# Patient Record
Sex: MALE | Race: WHITE | Employment: UNEMPLOYED | URBAN - METROPOLITAN AREA
[De-identification: names, ages, dates, MRNs, and addresses within clinical notes are randomized per-mention and may not be internally consistent; named-entity substitution may affect disease eponyms.]

---

## 2018-01-10 ENCOUNTER — OFFICE VISIT (OUTPATIENT)
Dept: GERIATRIC MEDICINE | Age: 58
End: 2018-01-10

## 2018-01-10 DIAGNOSIS — I63.9 CEREBROVASCULAR ACCIDENT (CVA), UNSPECIFIED MECHANISM (HCC): Primary | ICD-10-CM

## 2018-01-10 DIAGNOSIS — E55.9 VITAMIN D DEFICIENCY: ICD-10-CM

## 2018-01-10 DIAGNOSIS — I10 ESSENTIAL HYPERTENSION: ICD-10-CM

## 2018-01-10 DIAGNOSIS — R53.1 WEAKNESS: ICD-10-CM

## 2018-01-10 DIAGNOSIS — R13.10 DYSPHAGIA, UNSPECIFIED TYPE: ICD-10-CM

## 2018-01-10 DIAGNOSIS — F41.9 ANXIETY: ICD-10-CM

## 2018-01-10 DIAGNOSIS — R56.9 SEIZURE (HCC): ICD-10-CM

## 2018-01-10 LAB
INR BLD: 1.1
PROTIME: 11.7 SECONDS

## 2018-01-10 PROCEDURE — 3017F COLORECTAL CA SCREEN DOC REV: CPT | Performed by: INTERNAL MEDICINE

## 2018-01-10 PROCEDURE — 99306 1ST NF CARE HIGH MDM 50: CPT | Performed by: INTERNAL MEDICINE

## 2018-01-10 RX ORDER — DOXAZOSIN 2 MG/1
2 TABLET ORAL EVERY MORNING
COMMUNITY

## 2018-01-10 RX ORDER — FAMOTIDINE 20 MG/1
20 TABLET, FILM COATED ORAL 2 TIMES DAILY
COMMUNITY

## 2018-01-10 RX ORDER — ASPIRIN 81 MG/1
81 TABLET, CHEWABLE ORAL DAILY
COMMUNITY

## 2018-01-10 RX ORDER — FOLIC ACID 1 MG/1
1 TABLET ORAL DAILY
COMMUNITY
End: 2018-05-24

## 2018-01-10 RX ORDER — ALBUTEROL SULFATE 2.5 MG/3ML
2.5 SOLUTION RESPIRATORY (INHALATION) EVERY 4 HOURS PRN
COMMUNITY

## 2018-01-10 RX ORDER — SENNOSIDES A AND B 8.6 MG/1
1 TABLET, FILM COATED ORAL DAILY
COMMUNITY
End: 2018-05-24

## 2018-01-10 RX ORDER — FLUOXETINE 20 MG/5ML
2.5 SOLUTION ORAL DAILY
COMMUNITY

## 2018-01-10 RX ORDER — ATORVASTATIN CALCIUM 80 MG/1
80 TABLET, FILM COATED ORAL DAILY
COMMUNITY

## 2018-01-10 RX ORDER — ACETAMINOPHEN 650 MG/1
650 SUPPOSITORY RECTAL EVERY 4 HOURS PRN
COMMUNITY

## 2018-01-10 RX ORDER — PHENYTOIN 125 MG/5ML
1.2 SUSPENSION ORAL NIGHTLY
COMMUNITY

## 2018-01-10 RX ORDER — BUDESONIDE 0.5 MG/2ML
1 INHALANT ORAL 2 TIMES DAILY
COMMUNITY

## 2018-01-10 RX ORDER — OMEPRAZOLE 20 MG/1
20 CAPSULE, DELAYED RELEASE ORAL 2 TIMES DAILY
COMMUNITY
End: 2018-05-24

## 2018-01-10 RX ORDER — WARFARIN SODIUM 5 MG/1
5 TABLET ORAL DAILY
COMMUNITY
End: 2018-05-24

## 2018-01-10 RX ORDER — ELECTROLYTES/DEXTROSE
1 SOLUTION, ORAL ORAL EVERY MORNING
COMMUNITY

## 2018-01-10 RX ORDER — ACETAMINOPHEN 325 MG/1
650 TABLET ORAL EVERY 4 HOURS PRN
COMMUNITY

## 2018-01-10 RX ORDER — NICOTINE 21 MG/24HR
1 PATCH, TRANSDERMAL 24 HOURS TRANSDERMAL EVERY 24 HOURS
COMMUNITY
End: 2018-05-24

## 2018-01-11 LAB
BUN BLDV-MCNC: 9 MG/DL
CALCIUM SERPL-MCNC: 9.5 MG/DL
CHLORIDE BLD-SCNC: 102 MMOL/L
CO2: 25 MMOL/L
CREAT SERPL-MCNC: 0.5 MG/DL
GFR CALCULATED: NORMAL
GLUCOSE BLD-MCNC: 91 MG/DL
INR BLD: 1.1
POTASSIUM SERPL-SCNC: 4 MMOL/L
PROTIME: 12 SECONDS
SODIUM BLD-SCNC: 139 MMOL/L

## 2018-01-16 VITALS — TEMPERATURE: 96.9 F | DIASTOLIC BLOOD PRESSURE: 60 MMHG | SYSTOLIC BLOOD PRESSURE: 132 MMHG | HEART RATE: 82 BPM

## 2018-01-16 LAB
BUN BLDV-MCNC: 8 MG/DL
CALCIUM SERPL-MCNC: 9.5 MG/DL
CHLORIDE BLD-SCNC: 103 MMOL/L
CO2: 24 MMOL/L
CREAT SERPL-MCNC: 0.7 MG/DL
GFR CALCULATED: NORMAL
GLUCOSE BLD-MCNC: 96 MG/DL
POTASSIUM SERPL-SCNC: 4.1 MMOL/L
SODIUM BLD-SCNC: 139 MMOL/L

## 2018-01-16 NOTE — PROGRESS NOTES
PATIENT: Artie Souza : 1960 DOS: 01/10/2018     [facility] COMMU    Admission history and physical.     CHIEF COMPLAINT:  Weakness status post CVA, COPD. HISTORY OF PRESENT ILLNESS:  This is a 50-year-old gentleman admitted here from [facility]. The patient has a history of alcoholism, seizure disorder along with COPD. The patient presents to [facility] on 2017 after a fall. The patient was found to have evidence of CVA, facial asymmetry. No loss of consciousness. The patient was intubated for airway protection. The patient did have acute infarct in the left insular cortex, possible small cortical infarct in the left parietal lobe. The patient did have occlusion of the left ICA along with sub-occlusive thrombus throughout M1, M2 territory in the left MCA. The patient was placed in the neuro ICU. The patient had been placed on aspirin given his echocardiogram, MRI of the head. The patient was given Dilantin for seizure evaluation. The patient was seen by vascular surgery, who did evaluate the patient for his chronic thrombus. The patient was recommended non-operative intervention. The patient did undergo IVC filter placement by interventional radiology on 2017 with the plan to remove the filter after 6 weeks after discharge. The patient unfortunately did have evidence of GI bleed with coffee-ground emesis while on heparin. The patient's hemoglobin and hematocrit stabilized. The patient did have DVT in left lower extremity. The patient did have possible type 2 non-ST elevation MI, respiratory failure. The patient was stabilized, was profoundly weak globally. The patient did undergo inpatient rehabilitation and subsequently transferred here for course of rehabilitation prior to return home.       PAST MEDICAL HISTORY:  Included hypertension, status post CVA in left MCA distribution, weakness, seizure disorder, alcoholism, ataxia, unsteadiness, DVT

## 2018-01-17 LAB
BUN BLDV-MCNC: 7 MG/DL
CALCIUM SERPL-MCNC: 9.1 MG/DL
CHLORIDE BLD-SCNC: 102 MMOL/L
CO2: 25 MMOL/L
CREAT SERPL-MCNC: 0.6 MG/DL
GFR CALCULATED: NORMAL
GLUCOSE BLD-MCNC: 133 MG/DL
POTASSIUM SERPL-SCNC: 3.6 MMOL/L
SODIUM BLD-SCNC: 137 MMOL/L

## 2018-01-18 LAB
BASOPHILS ABSOLUTE: ABNORMAL /ΜL
BASOPHILS RELATIVE PERCENT: ABNORMAL %
BUN BLDV-MCNC: 7 MG/DL
CALCIUM SERPL-MCNC: 9.4 MG/DL
CHLORIDE BLD-SCNC: 104 MMOL/L
CO2: 27 MMOL/L
CREAT SERPL-MCNC: 0.6 MG/DL
EOSINOPHILS ABSOLUTE: ABNORMAL /ΜL
EOSINOPHILS RELATIVE PERCENT: ABNORMAL %
GFR CALCULATED: NORMAL
GLUCOSE BLD-MCNC: 89 MG/DL
HCT VFR BLD CALC: 39.6 % (ref 41–53)
HEMOGLOBIN: 13.1 G/DL (ref 13.5–17.5)
LYMPHOCYTES ABSOLUTE: ABNORMAL /ΜL
LYMPHOCYTES RELATIVE PERCENT: ABNORMAL %
MCH RBC QN AUTO: 31.8 PG
MCHC RBC AUTO-ENTMCNC: 33.1 G/DL
MCV RBC AUTO: 96.1 FL
MONOCYTES ABSOLUTE: ABNORMAL /ΜL
MONOCYTES RELATIVE PERCENT: ABNORMAL %
NEUTROPHILS ABSOLUTE: ABNORMAL /ΜL
NEUTROPHILS RELATIVE PERCENT: ABNORMAL %
PLATELET # BLD: 356 K/ΜL
PMV BLD AUTO: 9.6 FL
POTASSIUM SERPL-SCNC: 4.3 MMOL/L
RBC # BLD: 4.12 10^6/ΜL
SODIUM BLD-SCNC: 139 MMOL/L
WBC # BLD: 8.4 10^3/ML

## 2018-01-22 LAB
INR BLD: 3.6
PROTIME: 40.4 SECONDS

## 2018-01-25 ENCOUNTER — OFFICE VISIT (OUTPATIENT)
Dept: GERIATRIC MEDICINE | Age: 58
End: 2018-01-25
Payer: COMMERCIAL

## 2018-01-25 DIAGNOSIS — N48.89 PENIS PAIN: Primary | ICD-10-CM

## 2018-01-25 DIAGNOSIS — R31.9 HEMATURIA, UNSPECIFIED TYPE: ICD-10-CM

## 2018-01-25 PROCEDURE — 3017F COLORECTAL CA SCREEN DOC REV: CPT | Performed by: NURSE PRACTITIONER

## 2018-01-25 PROCEDURE — 99309 SBSQ NF CARE MODERATE MDM 30: CPT | Performed by: NURSE PRACTITIONER

## 2018-01-26 VITALS
HEART RATE: 78 BPM | BODY MASS INDEX: 19.77 KG/M2 | WEIGHT: 123 LBS | RESPIRATION RATE: 18 BRPM | OXYGEN SATURATION: 97 % | DIASTOLIC BLOOD PRESSURE: 60 MMHG | SYSTOLIC BLOOD PRESSURE: 110 MMHG | HEIGHT: 66 IN | TEMPERATURE: 97.9 F

## 2018-01-30 LAB
INR BLD: 2.6
PROTIME: 28.3 SECONDS

## 2018-02-01 ENCOUNTER — OFFICE VISIT (OUTPATIENT)
Dept: GERIATRIC MEDICINE | Age: 58
End: 2018-02-01
Payer: COMMERCIAL

## 2018-02-01 DIAGNOSIS — Z97.8 CHRONIC INDWELLING FOLEY CATHETER: ICD-10-CM

## 2018-02-01 DIAGNOSIS — N31.9 NEUROGENIC BLADDER: ICD-10-CM

## 2018-02-01 DIAGNOSIS — N48.89 PENIS PAIN: Primary | ICD-10-CM

## 2018-02-01 PROCEDURE — 3017F COLORECTAL CA SCREEN DOC REV: CPT | Performed by: NURSE PRACTITIONER

## 2018-02-01 PROCEDURE — 99308 SBSQ NF CARE LOW MDM 20: CPT | Performed by: NURSE PRACTITIONER

## 2018-02-02 VITALS
WEIGHT: 118 LBS | HEART RATE: 74 BPM | RESPIRATION RATE: 18 BRPM | BODY MASS INDEX: 18.96 KG/M2 | SYSTOLIC BLOOD PRESSURE: 118 MMHG | TEMPERATURE: 98.1 F | HEIGHT: 66 IN | DIASTOLIC BLOOD PRESSURE: 70 MMHG | OXYGEN SATURATION: 97 %

## 2018-02-06 LAB
INR BLD: 1.4
PROTIME: 15.4 SECONDS

## 2018-02-13 LAB
INR BLD: 1
PROTIME: 10.7 SECONDS

## 2018-02-14 PROBLEM — Z97.8 CHRONIC INDWELLING FOLEY CATHETER: Status: ACTIVE | Noted: 2018-02-14

## 2018-02-14 PROBLEM — N48.89 PENIS PAIN: Status: ACTIVE | Noted: 2018-02-14

## 2018-02-14 PROBLEM — N31.9 NEUROGENIC BLADDER: Status: ACTIVE | Noted: 2018-02-14

## 2018-02-15 ENCOUNTER — OFFICE VISIT (OUTPATIENT)
Dept: GERIATRIC MEDICINE | Age: 58
End: 2018-02-15
Payer: COMMERCIAL

## 2018-02-15 DIAGNOSIS — Z51.81 INADEQUATE ANTICOAGULATION: ICD-10-CM

## 2018-02-15 DIAGNOSIS — I69.30 COMPLICATIONS OF STROKE: Primary | ICD-10-CM

## 2018-02-15 DIAGNOSIS — Z79.01 INADEQUATE ANTICOAGULATION: ICD-10-CM

## 2018-02-15 PROCEDURE — 3017F COLORECTAL CA SCREEN DOC REV: CPT | Performed by: NURSE PRACTITIONER

## 2018-02-15 PROCEDURE — 99308 SBSQ NF CARE LOW MDM 20: CPT | Performed by: NURSE PRACTITIONER

## 2018-02-16 VITALS
OXYGEN SATURATION: 96 % | SYSTOLIC BLOOD PRESSURE: 110 MMHG | WEIGHT: 114 LBS | HEART RATE: 78 BPM | BODY MASS INDEX: 18.32 KG/M2 | HEIGHT: 66 IN | RESPIRATION RATE: 18 BRPM | DIASTOLIC BLOOD PRESSURE: 66 MMHG | TEMPERATURE: 97.9 F

## 2018-02-28 NOTE — PROGRESS NOTES
He said he would be willing to take that especially since it did not require more lab draws and we will follow up p.r.n. POC, pertinent labs reviewed.           Electronically Signed By: CLIF Jones GNP-BC on 02/21/2018 16:56:26  ______________________________  CLIF Jones GNP-BC  /[id number]  D: 02/17/2018 17:00:24  T: 02/18/2018 00:23:11    cc: - [id number] [facility]

## 2018-03-08 ENCOUNTER — OFFICE VISIT (OUTPATIENT)
Dept: GERIATRIC MEDICINE | Age: 58
End: 2018-03-08
Payer: COMMERCIAL

## 2018-03-08 DIAGNOSIS — N30.01 HEMATURIA DUE TO ACUTE CYSTITIS: Primary | ICD-10-CM

## 2018-03-08 PROCEDURE — 3017F COLORECTAL CA SCREEN DOC REV: CPT | Performed by: NURSE PRACTITIONER

## 2018-03-08 PROCEDURE — 99308 SBSQ NF CARE LOW MDM 20: CPT | Performed by: NURSE PRACTITIONER

## 2018-03-09 VITALS
BODY MASS INDEX: 19.13 KG/M2 | DIASTOLIC BLOOD PRESSURE: 62 MMHG | HEART RATE: 81 BPM | TEMPERATURE: 96.4 F | RESPIRATION RATE: 20 BRPM | OXYGEN SATURATION: 92 % | HEIGHT: 66 IN | WEIGHT: 119 LBS | SYSTOLIC BLOOD PRESSURE: 130 MMHG

## 2018-03-09 LAB
BILIRUBIN, URINE: NEGATIVE
BLOOD, URINE: POSITIVE
CLARITY: ABNORMAL
COLOR: ABNORMAL
GLUCOSE URINE: NEGATIVE
KETONES, URINE: NEGATIVE
LEUKOCYTE ESTERASE, URINE: ABNORMAL
NITRITE, URINE: NEGATIVE
PH UA: 6 (ref 4.5–8)
PROTEIN UA: ABNORMAL
SPECIFIC GRAVITY, URINE: 1.02
UROBILINOGEN, URINE: NORMAL

## 2018-03-12 NOTE — PROGRESS NOTES
PATIENT: Renate Weiss : 1960 DOS: 2018     [facility]    CHIEF COMPLAINT: The patient is being seen for complaints of catheter pain and hematuria. He was ordered normal saline if needed for no urine output. He is having some urine output. PAST MEDICAL HISTORY: Neurogenic bladder with indwelling Mayo catheter, has seen a urologist for these same complaints in the past. He has had penis pain that was unresolved. He did not have an urine infection at the time of the penis pain occurring, also hematuria with no infection. FAMILY/MEDICAL HISTORY: See Epic chart. MEDICATIONS/ALLERGIES: Reviewed. REVIEW OF SYSTEMS: CONSTITUTIONAL: He has had no fever. He is thin and frail, poor appetite. He states he is not having any chills or sweats that he is aware of. : The Mayo catheter is draining. There is a small amount of bloody sedimentation noted, but no large clots. He also has a lot of mucous in sedimentation in the catheter itself. GI: He denies any nausea, vomiting, diarrhea. PHYSICAL EXAMINATION: VITAL SIGNS: 114 pounds, 110/66, 97.9, 78, 18. CONSTITUTIONAL: He is lying in bed. He is in no acute distress. HEENT: Mucosa is moist and pink. : As noted above in HPI, urinary catheter in place, draining. There is no penile swelling or redness. No exudate at the urethra. The urine itself is cloudy with white sedimentation and there is some blood sedimentation noted. There is no pain in handling the catheter. ASSESSMENT AND PLAN:   1. Hematuria with previous catheter pain. 2.  No urine output that has resolved. It could have been some sort of blockage in the catheter itself. PLAN:  UA, C&S and it is okay to follow up with the urologist again. He has gone to the urologist for the same issues in the past for some hematuria that showed no infection at that time. We will continue to follow up p.r.n.           Electronically Signed By: Kathryn Jean-Baptiste, CLIF, MIGUELANGEL on 03/12/2018 08:53:32  ______________________________  CLIF Jones, GNP-BC  /[id number]  D: 03/08/2018 11:30:17  T: 03/09/2018 00:03:47    cc: - [phone] [facility]

## 2018-04-09 ENCOUNTER — OFFICE VISIT (OUTPATIENT)
Dept: GERIATRIC MEDICINE | Age: 58
End: 2018-04-09
Payer: MEDICAID

## 2018-04-09 DIAGNOSIS — E78.2 MIXED HYPERLIPIDEMIA: ICD-10-CM

## 2018-04-09 DIAGNOSIS — N48.89 PENIS PAIN: ICD-10-CM

## 2018-04-09 DIAGNOSIS — I69.30 HISTORY OF CVA WITH RESIDUAL DEFICIT: Primary | ICD-10-CM

## 2018-04-09 DIAGNOSIS — G40.909 SEIZURE DISORDER (HCC): ICD-10-CM

## 2018-04-09 PROCEDURE — 3017F COLORECTAL CA SCREEN DOC REV: CPT | Performed by: NURSE PRACTITIONER

## 2018-04-09 PROCEDURE — 99308 SBSQ NF CARE LOW MDM 20: CPT | Performed by: NURSE PRACTITIONER

## 2018-04-10 LAB
ALBUMIN SERPL-MCNC: 3.4 G/DL
ALP BLD-CCNC: 185 U/L
ALT SERPL-CCNC: 9 U/L
ANION GAP SERPL CALCULATED.3IONS-SCNC: NORMAL MMOL/L
AST SERPL-CCNC: 12 U/L
BILIRUB SERPL-MCNC: 0.3 MG/DL (ref 0.1–1.4)
BUN BLDV-MCNC: NORMAL MG/DL
CALCIUM SERPL-MCNC: NORMAL MG/DL
CHLORIDE BLD-SCNC: NORMAL MMOL/L
CHOLESTEROL, TOTAL: 139 MG/DL
CHOLESTEROL/HDL RATIO: 3.1
CO2: NORMAL MMOL/L
CREAT SERPL-MCNC: NORMAL MG/DL
GFR CALCULATED: NORMAL
GLUCOSE BLD-MCNC: NORMAL MG/DL
HDLC SERPL-MCNC: 29 MG/DL (ref 35–70)
LDL CHOLESTEROL CALCULATED: 89 MG/DL (ref 0–160)
POTASSIUM SERPL-SCNC: NORMAL MMOL/L
SODIUM BLD-SCNC: NORMAL MMOL/L
TOTAL PROTEIN: 6.4
TRIGL SERPL-MCNC: 103 MG/DL
VLDLC SERPL CALC-MCNC: 21 MG/DL

## 2018-04-16 VITALS
HEART RATE: 89 BPM | OXYGEN SATURATION: 95 % | BODY MASS INDEX: 17.19 KG/M2 | TEMPERATURE: 97 F | SYSTOLIC BLOOD PRESSURE: 111 MMHG | WEIGHT: 107 LBS | DIASTOLIC BLOOD PRESSURE: 79 MMHG | HEIGHT: 66 IN | RESPIRATION RATE: 18 BRPM

## 2018-04-23 ENCOUNTER — OFFICE VISIT (OUTPATIENT)
Dept: GERIATRIC MEDICINE | Age: 58
End: 2018-04-23
Payer: MEDICAID

## 2018-04-23 DIAGNOSIS — R62.7 FTT (FAILURE TO THRIVE) IN ADULT: Primary | ICD-10-CM

## 2018-04-23 PROCEDURE — 99308 SBSQ NF CARE LOW MDM 20: CPT | Performed by: NURSE PRACTITIONER

## 2018-04-24 PROBLEM — I69.30 HISTORY OF CVA WITH RESIDUAL DEFICIT: Status: ACTIVE | Noted: 2018-04-24

## 2018-04-25 VITALS
DIASTOLIC BLOOD PRESSURE: 79 MMHG | RESPIRATION RATE: 18 BRPM | SYSTOLIC BLOOD PRESSURE: 111 MMHG | HEART RATE: 89 BPM | OXYGEN SATURATION: 95 % | TEMPERATURE: 97 F | WEIGHT: 101 LBS | HEIGHT: 66 IN | BODY MASS INDEX: 16.23 KG/M2

## 2018-05-08 ENCOUNTER — OFFICE VISIT (OUTPATIENT)
Dept: GERIATRIC MEDICINE | Age: 58
End: 2018-05-08
Payer: MEDICAID

## 2018-05-08 DIAGNOSIS — R53.1 WEAKNESS: ICD-10-CM

## 2018-05-08 DIAGNOSIS — G40.909 SEIZURE DISORDER (HCC): Primary | ICD-10-CM

## 2018-05-08 DIAGNOSIS — M15.9 PRIMARY OSTEOARTHRITIS INVOLVING MULTIPLE JOINTS: ICD-10-CM

## 2018-05-08 LAB
ALBUMIN SERPL-MCNC: 3.3 G/DL
ALP BLD-CCNC: 136 U/L
ALT SERPL-CCNC: 14 U/L
ANION GAP SERPL CALCULATED.3IONS-SCNC: NORMAL MMOL/L
AST SERPL-CCNC: 13 U/L
BASOPHILS ABSOLUTE: 0.1 /ΜL
BASOPHILS RELATIVE PERCENT: 1.2 %
BILIRUB SERPL-MCNC: 0.2 MG/DL (ref 0.1–1.4)
BUN BLDV-MCNC: 9 MG/DL
CALCIUM SERPL-MCNC: 9.6 MG/DL
CHLORIDE BLD-SCNC: 105 MMOL/L
CO2: 24 MMOL/L
CREAT SERPL-MCNC: 0.6 MG/DL
EOSINOPHILS ABSOLUTE: 0.4 /ΜL
EOSINOPHILS RELATIVE PERCENT: 4.1 %
GFR CALCULATED: NORMAL
GLUCOSE BLD-MCNC: 124 MG/DL
HCT VFR BLD CALC: 39 % (ref 41–53)
HEMOGLOBIN: 12.8 G/DL (ref 13.5–17.5)
INR BLD: 1
LYMPHOCYTES ABSOLUTE: 2.2 /ΜL
LYMPHOCYTES RELATIVE PERCENT: 24.1 %
MCH RBC QN AUTO: 29.9 PG
MCHC RBC AUTO-ENTMCNC: 32.8 G/DL
MCV RBC AUTO: 91.1 FL
MONOCYTES ABSOLUTE: 0.6 /ΜL
MONOCYTES RELATIVE PERCENT: 6.6 %
NEUTROPHILS ABSOLUTE: 6 /ΜL
NEUTROPHILS RELATIVE PERCENT: 64 %
PLATELET # BLD: 405 K/ΜL
PMV BLD AUTO: 8.4 FL
POTASSIUM SERPL-SCNC: 4.3 MMOL/L
PROTIME: 10.7 SECONDS
RBC # BLD: 4.28 10^6/ΜL
SODIUM BLD-SCNC: 142 MMOL/L
TOTAL PROTEIN: 6.3
WBC # BLD: 9.3 10^3/ML

## 2018-05-08 PROCEDURE — 99308 SBSQ NF CARE LOW MDM 20: CPT | Performed by: INTERNAL MEDICINE

## 2018-05-24 ENCOUNTER — OFFICE VISIT (OUTPATIENT)
Dept: SURGERY | Age: 58
End: 2018-05-24
Payer: MEDICAID

## 2018-05-24 VITALS
HEART RATE: 100 BPM | SYSTOLIC BLOOD PRESSURE: 114 MMHG | DIASTOLIC BLOOD PRESSURE: 72 MMHG | TEMPERATURE: 96.6 F | OXYGEN SATURATION: 98 %

## 2018-05-24 DIAGNOSIS — E63.9 POOR NUTRITION: Primary | ICD-10-CM

## 2018-05-24 PROCEDURE — 99203 OFFICE O/P NEW LOW 30 MIN: CPT | Performed by: SURGERY

## 2018-05-24 RX ORDER — CLOPIDOGREL BISULFATE 75 MG/1
75 TABLET ORAL DAILY
COMMUNITY

## 2018-05-24 RX ORDER — MIRTAZAPINE 15 MG/1
15 TABLET, FILM COATED ORAL NIGHTLY
COMMUNITY

## 2018-05-24 RX ORDER — ALBUTEROL SULFATE 90 UG/1
2 AEROSOL, METERED RESPIRATORY (INHALATION) EVERY 6 HOURS PRN
COMMUNITY

## 2018-05-24 RX ORDER — NITROFURANTOIN 25; 75 MG/1; MG/1
100 CAPSULE ORAL 2 TIMES DAILY
COMMUNITY

## 2018-05-24 RX ORDER — DOCUSATE SODIUM 100 MG/1
100 CAPSULE, LIQUID FILLED ORAL 2 TIMES DAILY PRN
COMMUNITY

## 2018-05-24 ASSESSMENT — ENCOUNTER SYMPTOMS
SHORTNESS OF BREATH: 1
COLOR CHANGE: 0
CONSTIPATION: 0
EYES NEGATIVE: 1
ABDOMINAL PAIN: 0
ALLERGIC/IMMUNOLOGIC NEGATIVE: 1
BLOOD IN STOOL: 0
RHINORRHEA: 0
ABDOMINAL DISTENTION: 0
CHEST TIGHTNESS: 0

## 2018-06-08 ENCOUNTER — OFFICE VISIT (OUTPATIENT)
Dept: GERIATRIC MEDICINE | Age: 58
End: 2018-06-08
Payer: MEDICAID

## 2018-06-08 DIAGNOSIS — R33.9 URINARY RETENTION: ICD-10-CM

## 2018-06-08 DIAGNOSIS — D64.9 ANEMIA, UNSPECIFIED TYPE: ICD-10-CM

## 2018-06-08 DIAGNOSIS — J44.9 CHRONIC OBSTRUCTIVE PULMONARY DISEASE, UNSPECIFIED COPD TYPE (HCC): Primary | ICD-10-CM

## 2018-06-08 PROCEDURE — 99308 SBSQ NF CARE LOW MDM 20: CPT | Performed by: NURSE PRACTITIONER

## 2018-07-01 NOTE — PROGRESS NOTES
[facility]  Chief Complaint   Patient presents with    COPD    Anemia    Urinary Retention       The patient is being seen today for COPD, anemia and urinary retention. SUBJECTIVE: Resident offers no concerns. He can offer limited narrative due to having speech impairment. Nursing staff reports he remains in his baseline. FAMILY/SOCIAL HISTORY: Refer to H&P. Past Medical History:   Diagnosis Date    Anemia     Anxiety     CAD (coronary artery disease)     [facility]     [facility]     Epilepsy ([facility]     GERD (gastroesophageal reflux disease)     History of CVA with residual deficit 4/24/2018    Hyperlipidemia     Hypertension     Vitamin D deficiency        MEDICATIONS AND ALLERGIES: Reviewed on chart at nursing facility. REVIEW OF SYSTEMS: Resident denies any headache, dizziness, blurred vision, chest pain, shortness of breath, abdominal pain, nausea, vomiting, or changes in his bowel or bladder habits. He reports he is eating well, sleeping well and has no pain. PHYSICAL EXAMINATION: Most recent vital signs: /77, temp 98.5, heart rate 88, respirations 18, pulse oxing 95% on room air. CONSTITUTIONAL: Resident is alert, elderly male, in no apparent distress, pleasant and cooperative with exam. NEUROPSYCH: He is alert, pleasant and cooperative. He does have thick speech. INTEGUMENT: Pink, warm, dry. HEENT: Normocephalic, atraumatic. Conjunctivae pink. Sclerae nonicteric. Mucous membranes pink, moist. No oropharyngeal exudate. NECK: Supple. No JVD noted. CV: RRR. No MGR. RESPIRATORY: LSCTA. Respirations even, unlabored. ABDOMEN: Soft, NT, ND. Normoactive bowel sounds. PV: Peripheral pulses present. No edema noted. He does have Mayo to CV draining clear yellow urine. ASSESSMENT AND PLAN:   1. COPD. Resident's respiratory status is stable.  He has not had any hypoxic episodes or episodes of

## 2018-07-02 VITALS
RESPIRATION RATE: 18 BRPM | TEMPERATURE: 98.5 F | OXYGEN SATURATION: 95 % | DIASTOLIC BLOOD PRESSURE: 77 MMHG | HEART RATE: 88 BPM | SYSTOLIC BLOOD PRESSURE: 124 MMHG

## 2018-07-14 PROBLEM — R33.9 URINARY RETENTION: Status: ACTIVE | Noted: 2018-06-08

## 2018-07-16 LAB
ALBUMIN SERPL-MCNC: 3.4 G/DL
ALP BLD-CCNC: 130 U/L
ALT SERPL-CCNC: 8 U/L
ANION GAP SERPL CALCULATED.3IONS-SCNC: NORMAL MMOL/L
AST SERPL-CCNC: 13 U/L
BILIRUB SERPL-MCNC: 0.2 MG/DL (ref 0.1–1.4)
BUN BLDV-MCNC: 10 MG/DL
CALCIUM SERPL-MCNC: 9.2 MG/DL
CHLORIDE BLD-SCNC: 103 MMOL/L
CO2: 27 MMOL/L
CREAT SERPL-MCNC: 0.6 MG/DL
GFR CALCULATED: NORMAL
GLUCOSE BLD-MCNC: 115 MG/DL
POTASSIUM SERPL-SCNC: 3.5 MMOL/L
SODIUM BLD-SCNC: 140 MMOL/L
TOTAL PROTEIN: 6.2

## 2018-07-24 ENCOUNTER — OFFICE VISIT (OUTPATIENT)
Dept: GERIATRIC MEDICINE | Age: 58
End: 2018-07-24
Payer: MEDICAID

## 2018-07-24 DIAGNOSIS — K59.00 CONSTIPATION, UNSPECIFIED CONSTIPATION TYPE: ICD-10-CM

## 2018-07-24 DIAGNOSIS — R13.10 DYSPHAGIA, UNSPECIFIED TYPE: Primary | ICD-10-CM

## 2018-07-24 DIAGNOSIS — M15.9 OSTEOARTHRITIS OF MULTIPLE JOINTS, UNSPECIFIED OSTEOARTHRITIS TYPE: ICD-10-CM

## 2018-07-24 DIAGNOSIS — I10 HYPERTENSION, UNSPECIFIED TYPE: ICD-10-CM

## 2018-07-24 PROCEDURE — 99309 SBSQ NF CARE MODERATE MDM 30: CPT | Performed by: INTERNAL MEDICINE

## 2018-08-22 ENCOUNTER — OFFICE VISIT (OUTPATIENT)
Dept: GERIATRIC MEDICINE | Age: 58
End: 2018-08-22
Payer: MEDICAID

## 2018-08-22 DIAGNOSIS — G40.909 SEIZURE DISORDER (HCC): ICD-10-CM

## 2018-08-22 DIAGNOSIS — J44.9 CHRONIC OBSTRUCTIVE PULMONARY DISEASE, UNSPECIFIED COPD TYPE (HCC): Primary | ICD-10-CM

## 2018-08-22 DIAGNOSIS — I10 HYPERTENSION, UNSPECIFIED TYPE: ICD-10-CM

## 2018-08-22 PROCEDURE — 99308 SBSQ NF CARE LOW MDM 20: CPT | Performed by: NURSE PRACTITIONER

## 2018-09-13 VITALS
RESPIRATION RATE: 18 BRPM | HEART RATE: 71 BPM | SYSTOLIC BLOOD PRESSURE: 125 MMHG | DIASTOLIC BLOOD PRESSURE: 64 MMHG | TEMPERATURE: 98.1 F | OXYGEN SATURATION: 97 %

## 2018-09-18 ENCOUNTER — OFFICE VISIT (OUTPATIENT)
Dept: GERIATRIC MEDICINE | Age: 58
End: 2018-09-18
Payer: MEDICAID

## 2018-09-18 DIAGNOSIS — G40.909 SEIZURE DISORDER (HCC): ICD-10-CM

## 2018-09-18 DIAGNOSIS — I10 ESSENTIAL HYPERTENSION: ICD-10-CM

## 2018-09-18 DIAGNOSIS — E11.9 DIABETES MELLITUS WITHOUT COMPLICATION (HCC): Primary | ICD-10-CM

## 2018-09-18 PROCEDURE — 99309 SBSQ NF CARE MODERATE MDM 30: CPT | Performed by: INTERNAL MEDICINE

## 2018-09-27 LAB
A/G RATIO: 1.4
ALBUMIN SERPL-MCNC: 3.8 G/DL
ALP BLD-CCNC: 126 U/L
ALT SERPL-CCNC: 12 U/L
AST SERPL-CCNC: 14 U/L
BILIRUB SERPL-MCNC: 0.1 MG/DL (ref 0.1–1.4)
BILIRUBIN DIRECT: NORMAL MG/DL
BILIRUBIN, INDIRECT: NORMAL
GLOBULIN: NORMAL
PROTEIN TOTAL: 6.6 G/DL

## 2018-10-30 ENCOUNTER — OFFICE VISIT (OUTPATIENT)
Dept: GERIATRIC MEDICINE | Age: 58
End: 2018-10-30
Payer: MEDICAID

## 2018-10-30 DIAGNOSIS — R62.7 FTT (FAILURE TO THRIVE) IN ADULT: ICD-10-CM

## 2018-10-30 DIAGNOSIS — R33.9 URINARY RETENTION: Primary | ICD-10-CM

## 2018-10-30 DIAGNOSIS — I10 ESSENTIAL HYPERTENSION: ICD-10-CM

## 2018-10-30 PROCEDURE — 99308 SBSQ NF CARE LOW MDM 20: CPT | Performed by: NURSE PRACTITIONER

## 2018-11-21 VITALS
DIASTOLIC BLOOD PRESSURE: 63 MMHG | RESPIRATION RATE: 18 BRPM | SYSTOLIC BLOOD PRESSURE: 94 MMHG | TEMPERATURE: 98 F | HEART RATE: 72 BPM | OXYGEN SATURATION: 97 %

## 2018-11-21 NOTE — PROGRESS NOTES
[facility]  Chief Complaint   Patient presents with    Failure To Thrive    Urinary Retention    Hypertension       REASON FOR VISIT: The patient is being seen today for monthly visit for failure to thrive, urinary retention, hypertension. SUBJECTIVE: Resident offers no concerns. He is very angry at the time of my visit with multiple complaints. Nursing staff report this is the resident's baseline. FAMILY AND SOCIAL HISTORY: Refer to H&P. Past Medical History:   Diagnosis Date    Anemia     Anxiety     CAD (coronary artery disease)     [facility]     [facility]     Epilepsy ([facility]     GERD (gastroesophageal reflux disease)     History of CVA with residual deficit 4/24/2018    Hyperlipidemia     Hypertension     Vitamin D deficiency        MEDICATIONS AND ALLERGIES: Reviewed on chart at nursing facility. REVIEW OF SYSTEMS: Resident is difficult to understand due to his thick speech; however, he is very angry about being in the facility. He wants to go home; otherwise, no concerns. PHYSICAL EXAMINATION: Most recent vital signs: BP 94/63, temp 98.0, heart rate 72, respirations 18, pulse oxing 97% on room air. CONSTITUTIONAL: Resident is frail, thin male, no apparent distress, anxious and angry at his baseline with thick speech. HEENT: Normocephalic, atraumatic. Conjunctivae pink. Sclerae nonicteric. Mucous membranes pink, moist. No oropharyngeal exudate. He does have thick speech. NECK: Supple. No JVD noted. CV: RRR. No MGR. RESPIRATORY: LSCTA. Respirations even, unlabored. ABDOMEN: Flat, soft, NT, ND. Normoactive bowel sounds. PV: Peripheral pulses present. No edema noted. : Mayo to CD draining pale yellow urine. ASSESSMENT AND PLAN:   1. Failure to thrive: Resident is refusing his meds and meals. I will have nursing staff follow up on palliative care versus hospice for the resident.  Nursing staff

## 2018-11-26 PROBLEM — R62.7 FTT (FAILURE TO THRIVE) IN ADULT: Status: ACTIVE | Noted: 2018-10-30

## 2018-11-27 ENCOUNTER — OFFICE VISIT (OUTPATIENT)
Dept: GERIATRIC MEDICINE | Age: 58
End: 2018-11-27
Payer: MEDICAID

## 2018-11-27 DIAGNOSIS — I10 ESSENTIAL HYPERTENSION: ICD-10-CM

## 2018-11-27 DIAGNOSIS — J42 CHRONIC BRONCHITIS, UNSPECIFIED CHRONIC BRONCHITIS TYPE (HCC): ICD-10-CM

## 2018-11-27 DIAGNOSIS — R62.7 ADULT FAILURE TO THRIVE: ICD-10-CM

## 2018-11-27 DIAGNOSIS — G40.909 SEIZURE DISORDER (HCC): Primary | ICD-10-CM

## 2018-11-27 PROCEDURE — 99309 SBSQ NF CARE MODERATE MDM 30: CPT | Performed by: INTERNAL MEDICINE

## 2018-12-14 ENCOUNTER — OFFICE VISIT (OUTPATIENT)
Dept: GERIATRIC MEDICINE | Age: 58
End: 2018-12-14
Payer: MEDICAID

## 2018-12-14 DIAGNOSIS — R33.9 URINARY RETENTION: ICD-10-CM

## 2018-12-14 DIAGNOSIS — I10 ESSENTIAL HYPERTENSION: Primary | ICD-10-CM

## 2018-12-14 DIAGNOSIS — G40.909 SEIZURE DISORDER (HCC): ICD-10-CM

## 2018-12-14 PROCEDURE — 99308 SBSQ NF CARE LOW MDM 20: CPT | Performed by: NURSE PRACTITIONER

## 2018-12-18 VITALS
HEART RATE: 80 BPM | BODY MASS INDEX: 17.76 KG/M2 | DIASTOLIC BLOOD PRESSURE: 62 MMHG | TEMPERATURE: 98.7 F | WEIGHT: 108.4 LBS | SYSTOLIC BLOOD PRESSURE: 112 MMHG

## 2018-12-18 NOTE — PROGRESS NOTES
PATIENT: Geraldine Severs : 1960 DOS: 2018     [facility] COMM    Seen for routine monthly visit for his diabetes, prior CVA with ataxia, seizure disorder. SUBJECTIVE: This 71-year-old gentleman was clinically stable. The patient has not had any new chest pain or palpitations. Old chart was reviewed. No evidence of any seizure activity. No significant confusional episodes. No evidence of any new acute psychosis. This patient is clinically stable with no evidence of acute change in his oral intake. The patient has not had any acute psychosis, no evidence of seizure activity. The patient remains on antiepileptic agents, tolerating them well. The patient has not had any adverse medication reaction. The patient is clinically stable at this time, remains on Dilantin. OBJECTIVE: Vital signs were stable. He was afebrile 98.7, pulse 80, blood pressure is 112/62, and weight is 108.4 pounds. He is alert, frail-appearing. Pupils are small, but they are reactive. Oral mucosa is moist. Chest showed no crackles. No wheezing. Cardiovascular exam showed a regular rate. Abdomen is soft, nontender. Extremities showed trace dorsal pulses. ASSESSMENT AND PLAN:   1. Seizure disorder. The patient remains on antiepileptic agents. Repeat A1c over the next 3 months. 2.  Hypertension. Blood pressure is stable. No orthostasis. 3.  COPD. No evidence of acute URIs, we will monitor. 4.  Failure to thrive. The patient remains on palliative service, we will follow up as needed.           Electronically Signed By: Joshua Sharpe M.D. on 2018 01:16:42  ______________________________  CHRISTOS Timmons/[id number]  D: 2018 13:53:13  T: 2018 10:55:46    cc: - [phone] [facility]

## 2018-12-27 LAB
A/G RATIO: 1.2
ALBUMIN SERPL-MCNC: 3.7 G/DL
ALP BLD-CCNC: 110 U/L
ALT SERPL-CCNC: 8 U/L
AST SERPL-CCNC: 14 U/L
BILIRUB SERPL-MCNC: 0.4 MG/DL (ref 0.1–1.4)
BILIRUBIN DIRECT: 0.2 MG/DL
BILIRUBIN, INDIRECT: NORMAL
GLOBULIN: NORMAL
PROTEIN TOTAL: 6.8 G/DL

## 2019-01-09 VITALS
TEMPERATURE: 97.3 F | WEIGHT: 109 LBS | HEART RATE: 74 BPM | RESPIRATION RATE: 20 BRPM | BODY MASS INDEX: 17.86 KG/M2 | DIASTOLIC BLOOD PRESSURE: 60 MMHG | SYSTOLIC BLOOD PRESSURE: 124 MMHG | OXYGEN SATURATION: 93 %

## 2019-01-09 LAB
ALBUMIN SERPL-MCNC: 3.9 G/DL
ALP BLD-CCNC: 119 U/L
ALT SERPL-CCNC: 9 U/L
ANION GAP SERPL CALCULATED.3IONS-SCNC: 1.3 MMOL/L
AST SERPL-CCNC: 16 U/L
BASOPHILS ABSOLUTE: 0.1 /ΜL
BASOPHILS RELATIVE PERCENT: 1.1 %
BILIRUB SERPL-MCNC: 0.5 MG/DL (ref 0.1–1.4)
BUN BLDV-MCNC: 12 MG/DL
CALCIUM SERPL-MCNC: 9.6 MG/DL
CHLORIDE BLD-SCNC: 106 MMOL/L
CHOLESTEROL, TOTAL: 157 MG/DL
CHOLESTEROL/HDL RATIO: 2.8
CO2: 24 MMOL/L
CREAT SERPL-MCNC: 0.7 MG/DL
EOSINOPHILS ABSOLUTE: 0.3 /ΜL
EOSINOPHILS RELATIVE PERCENT: 5.3 %
GFR CALCULATED: 115
GLUCOSE BLD-MCNC: 82 MG/DL
HCT VFR BLD CALC: 39.8 % (ref 41–53)
HDLC SERPL-MCNC: 37 MG/DL (ref 35–70)
HEMOGLOBIN: 13.3 G/DL (ref 13.5–17.5)
LDL CHOLESTEROL CALCULATED: 104 MG/DL (ref 0–160)
LYMPHOCYTES ABSOLUTE: 2.3 /ΜL
LYMPHOCYTES RELATIVE PERCENT: 35.5 %
MCH RBC QN AUTO: 30.5 PG
MCHC RBC AUTO-ENTMCNC: 33.5 G/DL
MCV RBC AUTO: 91.2 FL
MONOCYTES ABSOLUTE: 0.5 /ΜL
MONOCYTES RELATIVE PERCENT: 8.1 %
NEUTROPHILS ABSOLUTE: 3.2 /ΜL
NEUTROPHILS RELATIVE PERCENT: 50 %
PLATELET # BLD: 289 K/ΜL
PMV BLD AUTO: 9.3 FL
POTASSIUM SERPL-SCNC: 4.1 MMOL/L
RBC # BLD: 4.37 10^6/ΜL
SODIUM BLD-SCNC: 140 MMOL/L
TOTAL PROTEIN: 6.8
TRIGL SERPL-MCNC: 81 MG/DL
VLDLC SERPL CALC-MCNC: 16 MG/DL
WBC # BLD: 6.5 10^3/ML

## 2019-02-16 ENCOUNTER — OFFICE VISIT (OUTPATIENT)
Dept: GERIATRIC MEDICINE | Age: 59
End: 2019-02-16
Payer: MEDICAID

## 2019-02-16 DIAGNOSIS — E11.8 TYPE 2 DIABETES MELLITUS WITH COMPLICATION, UNSPECIFIED WHETHER LONG TERM INSULIN USE: ICD-10-CM

## 2019-02-16 DIAGNOSIS — I10 ESSENTIAL HYPERTENSION: Primary | ICD-10-CM

## 2019-02-16 DIAGNOSIS — E78.2 MIXED HYPERLIPIDEMIA: ICD-10-CM

## 2019-02-16 DIAGNOSIS — J42 CHRONIC BRONCHITIS, UNSPECIFIED CHRONIC BRONCHITIS TYPE (HCC): ICD-10-CM

## 2019-02-16 PROCEDURE — 99308 SBSQ NF CARE LOW MDM 20: CPT | Performed by: NURSE PRACTITIONER

## 2019-03-12 VITALS
DIASTOLIC BLOOD PRESSURE: 63 MMHG | SYSTOLIC BLOOD PRESSURE: 125 MMHG | TEMPERATURE: 97.5 F | OXYGEN SATURATION: 94 % | WEIGHT: 107.6 LBS | BODY MASS INDEX: 17.63 KG/M2 | HEART RATE: 75 BPM | RESPIRATION RATE: 18 BRPM

## 2019-03-21 ENCOUNTER — HOSPITAL ENCOUNTER (EMERGENCY)
Age: 59
Discharge: HOME | End: 2019-03-21
Attending: EMERGENCY MEDICINE
Payer: MEDICAID

## 2019-03-21 VITALS
RESPIRATION RATE: 16 BRPM | WEIGHT: 110 LBS | HEIGHT: 66 IN | OXYGEN SATURATION: 97 % | TEMPERATURE: 97.6 F | BODY MASS INDEX: 17.68 KG/M2

## 2019-03-21 DIAGNOSIS — T83.9XXA FOLEY CATHETER PROBLEM, INITIAL ENCOUNTER (HCC): Primary | ICD-10-CM

## 2019-03-21 DIAGNOSIS — N30.00 ACUTE CYSTITIS WITHOUT HEMATURIA: ICD-10-CM

## 2019-03-21 LAB
BACTERIA: ABNORMAL /HPF
BILIRUBIN URINE: NEGATIVE
BLOOD, URINE: ABNORMAL
CLARITY: ABNORMAL
COLOR: YELLOW
EPITHELIAL CELLS, UA: ABNORMAL /HPF (ref 0–5)
GLUCOSE URINE: NEGATIVE MG/DL
HYALINE CASTS: ABNORMAL /HPF (ref 0–5)
KETONES, URINE: NEGATIVE MG/DL
LEUKOCYTE ESTERASE, URINE: ABNORMAL
NITRITE, URINE: POSITIVE
PH UA: 5.5 (ref 5–9)
PROTEIN UA: 100 MG/DL
RBC UA: ABNORMAL /HPF (ref 0–2)
SPECIFIC GRAVITY UA: 1.01 (ref 1–1.03)
URINE REFLEX TO CULTURE: YES
UROBILINOGEN, URINE: 0.2 E.U./DL
WBC UA: >100 /HPF (ref 0–5)

## 2019-03-21 PROCEDURE — 81001 URINALYSIS AUTO W/SCOPE: CPT

## 2019-03-21 PROCEDURE — 87186 SC STD MICRODIL/AGAR DIL: CPT

## 2019-03-21 PROCEDURE — 99283 EMERGENCY DEPT VISIT LOW MDM: CPT

## 2019-03-21 PROCEDURE — 6370000000 HC RX 637 (ALT 250 FOR IP): Performed by: EMERGENCY MEDICINE

## 2019-03-21 PROCEDURE — 87086 URINE CULTURE/COLONY COUNT: CPT

## 2019-03-21 PROCEDURE — 96365 THER/PROPH/DIAG IV INF INIT: CPT

## 2019-03-21 PROCEDURE — 2580000003 HC RX 258: Performed by: EMERGENCY MEDICINE

## 2019-03-21 PROCEDURE — 87077 CULTURE AEROBIC IDENTIFY: CPT

## 2019-03-21 PROCEDURE — 6360000002 HC RX W HCPCS: Performed by: EMERGENCY MEDICINE

## 2019-03-21 RX ORDER — CIPROFLOXACIN 500 MG/1
500 TABLET, FILM COATED ORAL 2 TIMES DAILY
Qty: 14 TABLET | Refills: 0 | Status: SHIPPED | OUTPATIENT
Start: 2019-03-21 | End: 2019-03-28

## 2019-03-21 RX ORDER — SODIUM CHLORIDE 9 MG/ML
INJECTION, SOLUTION INTRAVENOUS
Status: DISCONTINUED
Start: 2019-03-21 | End: 2019-03-21 | Stop reason: HOSPADM

## 2019-03-21 RX ADMIN — CEFTRIAXONE SODIUM 1 G: 1 INJECTION, POWDER, FOR SOLUTION INTRAMUSCULAR; INTRAVENOUS at 02:49

## 2019-03-21 RX ADMIN — LIDOCAINE HYDROCHLORIDE: 20 JELLY TOPICAL at 03:03

## 2019-03-23 LAB
ORGANISM: ABNORMAL
ORGANISM: ABNORMAL
URINE CULTURE, ROUTINE: ABNORMAL

## 2019-03-27 LAB
A/G RATIO: 1.5
ALBUMIN SERPL-MCNC: 3.5 G/DL
ALP BLD-CCNC: 89 U/L
ALT SERPL-CCNC: 8 U/L
AST SERPL-CCNC: 10 U/L
BILIRUB SERPL-MCNC: 0.4 MG/DL (ref 0.1–1.4)
BILIRUBIN DIRECT: 0.1 MG/DL
BILIRUBIN, INDIRECT: NORMAL
GLOBULIN: NORMAL
PROTEIN TOTAL: 5.8 G/DL

## 2019-04-11 ENCOUNTER — OFFICE VISIT (OUTPATIENT)
Dept: GERIATRIC MEDICINE | Age: 59
End: 2019-04-11
Payer: MEDICAID

## 2019-04-11 DIAGNOSIS — Z87.19 HISTORY OF DYSPHAGIA: ICD-10-CM

## 2019-04-11 DIAGNOSIS — R46.89 EPISODE OF ABNORMAL BEHAVIOR: ICD-10-CM

## 2019-04-11 DIAGNOSIS — Z86.718 HISTORY OF DVT (DEEP VEIN THROMBOSIS): ICD-10-CM

## 2019-04-11 DIAGNOSIS — K21.00 GASTROESOPHAGEAL REFLUX DISEASE WITH ESOPHAGITIS: Primary | ICD-10-CM

## 2019-04-11 PROCEDURE — 99309 SBSQ NF CARE MODERATE MDM 30: CPT | Performed by: PHYSICIAN ASSISTANT

## 2019-04-16 ENCOUNTER — OFFICE VISIT (OUTPATIENT)
Dept: GERIATRIC MEDICINE | Age: 59
End: 2019-04-16
Payer: MEDICAID

## 2019-04-16 DIAGNOSIS — Z51.81 SUBTHERAPEUTIC PHENYTOIN LEVEL: ICD-10-CM

## 2019-04-16 DIAGNOSIS — Z91.199 NONCOMPLIANCE: Primary | ICD-10-CM

## 2019-04-16 PROCEDURE — 99308 SBSQ NF CARE LOW MDM 20: CPT | Performed by: PHYSICIAN ASSISTANT

## 2019-04-23 NOTE — PROGRESS NOTES
PATIENT: Adan Saeed : 1960 DOS: 2019     [facility]    HISTORY OF PRESENT ILLNESS: The patient is being seen for routine monthly visit for history of GERD, dysphagia, history of DVT as well as abnormal behavior including seizures. The patient denies was seen in his room today. The patient is a poor mood and does have a severe verbal expressive aphasia, showed increased frustration with my visit interrupting his day. Denied any symptoms at this time. Continuously refused that he had any new complaints. No new issues with indigestion. No dysphagia. Stated no pain in his lower legs. Visit was cut short due to the patient's in-cooperation. Expressed to the patient that I will be ordering few some labs as routine maintenance. The patient agreed, although reluctantly. No further complaints were offered today. MEDICATIONS: Reviewed. REVIEW OF SYSTEMS: The patient did not offer any review of systems, nor could be understood due to aphasia as well as uncooperative nature respectively. VITAL SIGNS: The patient's vitals were reviewed on site. PHYSICAL EXAMINATION: The patient normal hygiene, bright/severe affect, appeared own age and was in mild to moderate distress, aggravated over visit. HEENT: From a distance there is no new evidence of trauma or deformity. Pupils were equal and round. No injection or icterus. Normal hearing, appeared to be moist mucous membranes without erythema or exudate. No evidence of thrush. Unable to auscultate due to the patient's uncooperative nature. Skin is generally intact. No evident bruises, purpura, petechiae, rashes or scars that were of note. MENTAL STATUS: The patient is awake and alert, oriented 2/3. Poor memory and command following, very uncooperative. ASSESSMENT AND PLAN:  1. GERD - The patient is on famotidine 20 mg b.i.d. We will continue this therapy. No complaints today.   2.  Dysphagia - the patient denies any dysphagia symptoms. However, there is history of this. The patient has had lower appetite per nursing staff. We will order a prealbumin for tomorrow to assess intake. 3.  History of DVT - The patient is currently on aspirin 81 mg q.d. and Plavix 75 mg q.h.s. The patient has had no new acute complaints of lower extremity pain, edema or signs of DVT. 4.  Abnormal behaviors - patient will have his phenytoin level checked due to ongoing therapy.          Electronically Signed By: Caesar Harp PA-C on 04/21/2019 23:07:12  ______________________________  Caesar Harp PA-C  QX/[id number]  D: 04/11/2019 22:57:40  T: 04/12/2019 21:04:54    cc: - [phone] [facility]

## 2019-05-08 NOTE — PROGRESS NOTES
PATIENT: Richar Shelton : 1960 DOS: 2019     [facility] COMM[facility]    HISTORY OF PRESENT ILLNESS: The patient is being seen today for acute visit regarding continuance of refusal of meds including phenytoin. The patient has a low level today, less than 2.5, has been refusing medications for months. The patient has history of severe CVA with expressive aphasia. Medically noncompliant with most treatments. The patient is on. During visit the patient is obsessively trying to convince me to discharge him although he has no adequate means of caring for self medically or for ADLs and basic necessities. The patient does have family in the area. The patient seems to state that he has contacts with aunt and may be able to live with her and have family support for medical care needs. She is willing to do home health care at home. However, after speaking with nursing staff and administration it does not seem that there is a consensus between family and patient. Family has not visited in quite some time. We will continue to work with DON and administration to assist the patient in hopefully taking care of his needs and may be getting him to be discharged. However, told patient I cannot make any promises on delivering his request. The patient denies any acute medical needs today. He does have a chronic Mayo catheter that he desires to have it out above anything else, so he states. However, bladder becomes distended and he cannot void adequately on his own. No other complaints today. MEDICATIONS: Reviewed. REVIEW OF SYSTEMS: Unable to provide adequate review of systems due to severe dysphagia. However, per nursing staff no new fever, chills, nausea, vomiting, increased fatigue or weakness. HEENT: No new headache, confusion, lightheadedness or LOC. No new rhinorrhea, stuffiness, sneezing, sore throat or hoarseness. CARDIAC: Seems the patient denies chest pain or palpitation or orthopnea.  PULMONARY:

## 2019-05-14 ENCOUNTER — OFFICE VISIT (OUTPATIENT)
Dept: GERIATRIC MEDICINE | Age: 59
End: 2019-05-14
Payer: MEDICAID

## 2019-05-14 DIAGNOSIS — N30.00 ACUTE CYSTITIS WITHOUT HEMATURIA: Primary | ICD-10-CM

## 2019-05-14 PROCEDURE — 99308 SBSQ NF CARE LOW MDM 20: CPT | Performed by: PHYSICIAN ASSISTANT

## 2019-05-15 LAB
BILIRUBIN, URINE: NEGATIVE
BLOOD, URINE: POSITIVE
CLARITY: ABNORMAL
COLOR: YELLOW
GLUCOSE URINE: NEGATIVE
KETONES, URINE: NEGATIVE
LEUKOCYTE ESTERASE, URINE: POSITIVE
NITRITE, URINE: POSITIVE
PH UA: 7 (ref 4.5–8)
PROTEIN UA: ABNORMAL
SPECIFIC GRAVITY, URINE: 1.01
UROBILINOGEN, URINE: NORMAL

## 2019-05-16 ENCOUNTER — OFFICE VISIT (OUTPATIENT)
Dept: GERIATRIC MEDICINE | Age: 59
End: 2019-05-16
Payer: MEDICAID

## 2019-05-16 DIAGNOSIS — N30.01 ACUTE CYSTITIS WITH HEMATURIA: Primary | ICD-10-CM

## 2019-05-16 PROCEDURE — 99308 SBSQ NF CARE LOW MDM 20: CPT | Performed by: PHYSICIAN ASSISTANT

## 2019-05-17 NOTE — PROGRESS NOTES
SOBIA ANTONIO/[id number]  D: 05/14/2019 15:04:03  T: 05/14/2019 19:29:32    cc: - [phone]

## 2019-05-20 ENCOUNTER — OFFICE VISIT (OUTPATIENT)
Dept: GERIATRIC MEDICINE | Age: 59
End: 2019-05-20
Payer: MEDICAID

## 2019-05-20 DIAGNOSIS — E11.9 DIABETES MELLITUS WITHOUT COMPLICATION (HCC): ICD-10-CM

## 2019-05-20 DIAGNOSIS — M15.9 OSTEOARTHRITIS OF MULTIPLE JOINTS, UNSPECIFIED OSTEOARTHRITIS TYPE: ICD-10-CM

## 2019-05-20 DIAGNOSIS — I10 ESSENTIAL HYPERTENSION: Primary | ICD-10-CM

## 2019-05-20 DIAGNOSIS — K59.00 CONSTIPATION, UNSPECIFIED CONSTIPATION TYPE: ICD-10-CM

## 2019-05-20 PROCEDURE — 99309 SBSQ NF CARE MODERATE MDM 30: CPT | Performed by: INTERNAL MEDICINE

## 2019-05-29 NOTE — PROGRESS NOTES
PATIENT: Ila Bolton : 1960 DOS: 2019     [facility]    Seen for routine monthly visit for his COPD, diabetes, hypertension. MEDICATIONS:  Reviewed. SUBJECTIVE:  This 79-year-old gentleman who was evaluated in his room. The patient is at his baseline. He has had recent urinary tract infection, has recently had evidence of hyperglycemia. Blood sugars are stable. The patient has not had any new chest pain or palpitations. No new change in his bowel habits. The patient has not had any new evidence of confusional episodes. Nursing staff reports the patient has been clinically stable. OBJECTIVE:  He appeared at his baseline. Pupils are small and they are reactive. Oral mucosa is moist.  Chest showed no crackles. No wheezing. Cardiovascular exam showed a regular rate. Abdomen is soft, nontender. Extremities showed trace dorsal pedal pulse. ASSESSMENT AND PLAN:  1. Hypertension. Blood pressure is stable. No orthostasis. 2.   Degenerative joint disease. No new pain crisis. 3.   Constipation. No new impaction. We will monitor. 4.   Diabetes. No new symptomatic hypoglycemia. We will monitor closely.         Electronically Signed By: Yoli Torres M.D. on 2019 15:40:17  ______________________________  Yoli Torres M.D.  DM/[id number]  D: 2019 18:47:10  T: 2019 11:57:06    cc: - [phone] [facility]

## 2019-06-13 ENCOUNTER — OFFICE VISIT (OUTPATIENT)
Dept: GERIATRIC MEDICINE | Age: 59
End: 2019-06-13
Payer: MEDICAID

## 2019-06-13 DIAGNOSIS — F41.9 ANXIETY: ICD-10-CM

## 2019-06-13 DIAGNOSIS — R33.9 URINARY RETENTION: ICD-10-CM

## 2019-06-13 DIAGNOSIS — G40.909 SEIZURE DISORDER (HCC): Primary | ICD-10-CM

## 2019-06-13 PROCEDURE — 99307 SBSQ NF CARE SF MDM 10: CPT | Performed by: PHYSICIAN ASSISTANT

## 2019-06-20 NOTE — PROGRESS NOTES
PATIENT: Maryann Peace : 1960 DOS: 2019     [facility]    HPI:  The patient is being seen today for routine monthly follow up visit for seizure disorder, urinary retention, and chronic anxiety. Unable to establish interview with patient. The patient did not show any desire to be seen, was aggressive and agitated. The patient has severe dysphagia, very difficult to understand what patient was trying to say, frequently tries to say he wants to leave, does not want his Mayo catheter. Per nursing staff, he is refusing medications. Recently had a UTI, where he was coaxed into taking his antibiotic for coverage of positive UTI. Otherwise, patient very noncompliant with care, and uncooperative, angry and berating. MEDICATIONS:  Reviewed. REVIEW OF SYSTEMS:  Unable to establish due to patient's aggressive nature and poor communication, noncompliance. PHYSICAL EXAMINATION: Vital signs reviewed on site today. Unable to perform due to patient's aggressiveness. Overall looks unchanged from previous baseline. ASSESSMENT AND PLAN:  Aggressiveness/denial of care - patient continues to be noncompliant. We will revisit at later date when patient is more willing to discuss care and actually to be compliant in its administration. No new orders at this time. Overall patient maintains baseline .         Electronically Signed By: Annabelle Reynolds PA-C on 2019 17:39:49  ______________________________  Annabelle Reynolds PA-C  [id number]  D: 2019 18:58:25  T: 2019 22:46:36    cc: - [id number] [facility]

## 2019-06-25 ENCOUNTER — OFFICE VISIT (OUTPATIENT)
Dept: GERIATRIC MEDICINE | Age: 59
End: 2019-06-25
Payer: MEDICAID

## 2019-06-25 DIAGNOSIS — Z53.20 MEDICATION REFUSED: ICD-10-CM

## 2019-06-25 DIAGNOSIS — Z91.199 PATIENT NON-COMPLIANT, REFUSED SERVICE: ICD-10-CM

## 2019-06-25 DIAGNOSIS — Z53.20 ASSESSMENT EXAMINATION REFUSED: ICD-10-CM

## 2019-06-25 DIAGNOSIS — Z53.20 REFUSAL OF ANTIBIOTIC MEDICATION BY PATIENT: Primary | ICD-10-CM

## 2019-06-25 DIAGNOSIS — N39.0 RECURRENT UTI: ICD-10-CM

## 2019-06-25 DIAGNOSIS — Z86.69 HISTORY OF EPILEPSY: ICD-10-CM

## 2019-06-25 DIAGNOSIS — Z97.8 FOLEY CATHETER IN PLACE: ICD-10-CM

## 2019-06-25 PROCEDURE — 99308 SBSQ NF CARE LOW MDM 20: CPT | Performed by: PHYSICIAN ASSISTANT

## 2019-06-28 NOTE — PROGRESS NOTES
PATIENT: Beverly Yang : 1960 DOS: 2019     [facility]    HPI:  The patient being seen today for acute visit regarding recent urinalysis showing positive UTI. The patient is refusing all medications including antibiotics at this time. The patient is very difficult to understand and deliberate with. He is very agitated during every encounter I have had with this patient. Patient's culture returned showing ESBL organisms sensitive to Macrobid. Ordered Macrobid, patient is refusing all medications and yelling at nurse and I about his lack of desire to be compliant that he just wants to [place]. \"  The patient using very cursory language throughout visit, unpleasant to deal with during this visit. No additional complaints. MEDICATIONS:  Reviewed. REVIEW OF SYSTEMS:  The patient unwilling and not able to understand due to severe expressive dysphasia, status post CVA. The patient using very cursory language and unacceptable speech. PHYSICAL EXAMINATION: Vital signs reviewed in site today. The patient would not allow adequate physical examination. From overall glance, patient has remained at about baseline. The patient is thin and frail appearing. Distal pulses are intact. No acute evidence of arrhythmia or pain crisis. The patient is breathing normally. No accessory muscle usage. No evident JVD. The patient states he is having adequate bowel movements and has chronic Mayo catheter in place with straw colored urine that is slightly off color and hazy, otherwise patient unable to examine due to refusal.    ASSESSMENT AND PLAN:  Positive UTI - we switched antibiotic to Macrobid 100 mg p.o. b.i.d. times 5 days, as well as acidophilus 1 tab b.i.d. for 8 days. We will do our best along with staff to have patient take antibiotic to treat UTI to avoid further complications.         Electronically Signed By: Brenda Shrestha PA-C on 2019 19:11:28  ______________________________  SOBIA Anderson/[id number]  D: 06/24/2019 45:24:43  T: 06/25/2019 10:36:22    cc: - [phone] [facility]

## 2019-07-01 NOTE — PROGRESS NOTES
PATIENT: Denzel Samuels : 1960 DOS: 2019     [facility] COMMU    HPI: The patient currently has a positive UTI. However, the patient is refusing all medicines at this time. The patient does have chronic Mayo catheter. During visit, the patient typically does not want to be seen. Making attempts to convince patient to take medication for this. The patient also refusing phenytoin level and LFTs to assess current status. We will address with the nursing staff on a daily basis to try that patient comply with care. Otherwise, patient is noncompliant with medication regimen and treatment for likely UTI. MEDICATIONS:  Reviewed. REVIEW OF SYSTEMS:  Unable to establish due to patient's noncompliance and uncooperative nature. PHYSICAL EXAMINATION:  The patient refuses to be examined. General appearance is unchanged since last visit. ASSESSMENT AND PLAN:  1.   UTI/chronic Mayo catheter - we will have nursing staff try to convince patient to comply with treatment. We will add antibiotic if patient agrees. 2.   History of epilepsy - we will reattempt phenytoin and LFT level in the morning.         Electronically Signed By: Maya Bumpers, PA-C on 2019 08:31:13  ______________________________  Maya Bumpers, PA-C  /[id number]  D: 2019 20:52:49  T: 2019 23:39:55    cc: - [phone] [facility]

## 2019-08-01 ENCOUNTER — OFFICE VISIT (OUTPATIENT)
Dept: GERIATRIC MEDICINE | Age: 59
End: 2019-08-01
Payer: MEDICAID

## 2019-08-01 DIAGNOSIS — Z71.89 ENCOUNTER FOR HOSPICE CARE DISCUSSION: Primary | ICD-10-CM

## 2019-08-01 DIAGNOSIS — G40.909 SEIZURE DISORDER (HCC): ICD-10-CM

## 2019-08-01 DIAGNOSIS — Z91.199 PATIENT NONCOMPLIANCE, GENERAL: ICD-10-CM

## 2019-08-01 DIAGNOSIS — Z91.14 H/O MEDICATION NONCOMPLIANCE: ICD-10-CM

## 2019-08-01 PROCEDURE — 99308 SBSQ NF CARE LOW MDM 20: CPT | Performed by: PHYSICIAN ASSISTANT

## 2019-08-27 ENCOUNTER — OFFICE VISIT (OUTPATIENT)
Dept: GERIATRIC MEDICINE | Age: 59
End: 2019-08-27
Payer: MEDICAID

## 2019-08-27 DIAGNOSIS — Z97.8 CHRONIC INDWELLING FOLEY CATHETER: ICD-10-CM

## 2019-08-27 DIAGNOSIS — G40.909 SEIZURE DISORDER (HCC): Primary | ICD-10-CM

## 2019-08-27 DIAGNOSIS — R45.1 RESTLESSNESS AND AGITATION: ICD-10-CM

## 2019-08-27 PROCEDURE — 99308 SBSQ NF CARE LOW MDM 20: CPT | Performed by: PHYSICIAN ASSISTANT

## 2019-09-27 ENCOUNTER — OFFICE VISIT (OUTPATIENT)
Dept: GERIATRIC MEDICINE | Age: 59
End: 2019-09-27
Payer: MEDICAID

## 2019-09-27 DIAGNOSIS — I10 ESSENTIAL HYPERTENSION: ICD-10-CM

## 2019-09-27 DIAGNOSIS — G40.909 SEIZURE DISORDER (HCC): Primary | ICD-10-CM

## 2019-09-27 DIAGNOSIS — E11.9 DIABETES MELLITUS WITHOUT COMPLICATION (HCC): ICD-10-CM

## 2019-09-27 PROCEDURE — 99309 SBSQ NF CARE MODERATE MDM 30: CPT | Performed by: INTERNAL MEDICINE

## 2019-10-01 ENCOUNTER — OFFICE VISIT (OUTPATIENT)
Dept: GERIATRIC MEDICINE | Age: 59
End: 2019-10-01
Payer: MEDICAID

## 2019-10-01 DIAGNOSIS — I10 ESSENTIAL HYPERTENSION: ICD-10-CM

## 2019-10-01 DIAGNOSIS — R45.1 AGITATION: ICD-10-CM

## 2019-10-01 DIAGNOSIS — F41.9 ANXIETY: ICD-10-CM

## 2019-10-01 DIAGNOSIS — N31.9 NEUROGENIC BLADDER: Primary | ICD-10-CM

## 2019-10-01 PROCEDURE — 99308 SBSQ NF CARE LOW MDM 20: CPT | Performed by: PHYSICIAN ASSISTANT

## 2019-11-18 PROBLEM — Q21.10 ATRIAL SEPTAL DEFECT: Status: ACTIVE | Noted: 2017-12-24

## 2019-11-18 PROBLEM — R45.89 DEPRESSED MOOD: Status: ACTIVE | Noted: 2017-02-07

## 2019-11-18 PROBLEM — N39.0 URINARY TRACT INFECTION WITHOUT HEMATURIA: Status: ACTIVE | Noted: 2018-05-16

## 2019-11-18 PROBLEM — N48.89 PENIS PAIN: Status: RESOLVED | Noted: 2018-02-14 | Resolved: 2019-11-18

## 2019-11-18 PROBLEM — I65.22 LEFT CAROTID ARTERY OCCLUSION: Status: ACTIVE | Noted: 2017-12-16

## 2019-11-18 PROBLEM — I63.512 ARTERIAL ISCHEMIC STROKE, MCA (MIDDLE CEREBRAL ARTERY), LEFT, ACUTE (HCC): Status: ACTIVE | Noted: 2017-12-24

## 2019-11-27 ENCOUNTER — OFFICE VISIT (OUTPATIENT)
Dept: GERIATRIC MEDICINE | Age: 59
End: 2019-11-27
Payer: MEDICAID

## 2019-11-27 DIAGNOSIS — J06.9 VIRAL URI: Primary | ICD-10-CM

## 2019-11-27 DIAGNOSIS — I10 ESSENTIAL HYPERTENSION: ICD-10-CM

## 2019-11-27 DIAGNOSIS — E46 PROTEIN MALNUTRITION (HCC): ICD-10-CM

## 2019-11-27 PROCEDURE — 99308 SBSQ NF CARE LOW MDM 20: CPT | Performed by: INTERNAL MEDICINE

## 2019-12-17 ENCOUNTER — OFFICE VISIT (OUTPATIENT)
Dept: GERIATRIC MEDICINE | Age: 59
End: 2019-12-17
Payer: MEDICAID

## 2019-12-17 PROCEDURE — 99308 SBSQ NF CARE LOW MDM 20: CPT | Performed by: PHYSICIAN ASSISTANT

## 2020-01-22 ENCOUNTER — OFFICE VISIT (OUTPATIENT)
Dept: GERIATRIC MEDICINE | Age: 60
End: 2020-01-22
Payer: MEDICAID

## 2020-01-22 PROCEDURE — 99309 SBSQ NF CARE MODERATE MDM 30: CPT | Performed by: INTERNAL MEDICINE

## 2020-02-13 ENCOUNTER — OFFICE VISIT (OUTPATIENT)
Dept: GERIATRIC MEDICINE | Age: 60
End: 2020-02-13
Payer: MEDICAID

## 2020-02-13 PROCEDURE — 99309 SBSQ NF CARE MODERATE MDM 30: CPT | Performed by: PHYSICIAN ASSISTANT

## 2020-02-20 VITALS — SYSTOLIC BLOOD PRESSURE: 114 MMHG | DIASTOLIC BLOOD PRESSURE: 70 MMHG | TEMPERATURE: 96.2 F | HEART RATE: 88 BPM

## 2020-02-20 NOTE — PROGRESS NOTES
PATIENT: New Graham : 1960 DOS: 2020     [facility]    Seen for routine monthly visit for seizure disorder, diabetes, weakness, degenerative joint disease. MEDICATIONS:  Reviewed. SUBJECTIVE:  This very pleasant 56-year-old gentleman was evaluated in his room. The patient has been seizure-free. Weight remains below target. Oral intake has been fluctuant. The patient has not had any new nausea, vomiting. No recent emesis, fevers, chills. The patient has slow weight loss. No new bleeding diathesis. REVIEW OF SYSTEMS:  Unremarkable for evidence of acute psychosis. The patient has been globally frail. Unable to provide coherent narration in terms of 14-point review of systems. PHYSICAL EXAMINATION:  The patient's vital signs were stable. He was afebrile 96.2, pulse 88, blood pressure 114/70. He is frail in appearance. Pupils are small, but they are reactive. Oral mucosa is dry. Chest showed no crackles, no wheezing. Cardiovascular exam showed a regular rate. Abdomen is soft, nontender. Extremities showed trace dorsal pedal pulse. ASSESSMENT AND PLAN:  1. Seizure disorder:  The patient remains on antiepileptic agents, tolerating them well. 2.   Hypertension:  Blood pressure is stable. No orthostasis. 3.   Degenerative joint disease:  No new pain crisis. 4.   Weight loss: We will check prealbumin. Monitoring weights and possibly discuss with nutritional services to increase caloric intake.           Electronically Signed By: Edwardo Bustos M.D. on 2020 12:56:55  ______________________________  Edwardo Bustos M.D.  BY/[id number]  D: 2020 10:51:11  T: 2020 12:03:06    cc: - [phone] [facility]

## 2020-02-25 ENCOUNTER — OFFICE VISIT (OUTPATIENT)
Dept: GERIATRIC MEDICINE | Age: 60
End: 2020-02-25
Payer: MEDICAID

## 2020-02-25 PROCEDURE — 99316 NF DSCHRG MGMT 30 MIN+: CPT | Performed by: PHYSICIAN ASSISTANT

## 2020-02-25 NOTE — PROGRESS NOTES
PATIENT: JESÚS BAKER : 1960 DOS:2020       [facility]    REASON FOR VISIT: Patient being seen today for discharge summary report. SUBJECTIVE: Resident was admitted to facility status post CVA with history of CAD, hypertension, DVT, left carotid artery occlusion and arterial ischemic stroke of MCA left side. History of COPD, GERD, as well as ongoing seizure disorder. Patient has been very noncompliant with care throughout the time of known patient at this facility. Patient has recently increasingly become more compliant with care and PT/OT. Patient is making gains in strength patient does have ongoing right-sided weakness. Patient wants to discharge home with brother and his wife in [place]. Met with mother today who expressed concern over her brother's wellbeing and desire to have him home and not live his life in a nursing facility. Expressed that we will do over the weekend to ensure he maintains compliance with care and will be able to discharge home. Chief concerns regarding patient's discharge include compliance with seizure medications, follow-up with PCP and specialists, as well as possible ongoing chronic Mayo catheter usage due to urinary retention issues and UTI history. .  Patient will require a wheelchair and hospital bed at home due to significant right-sided weakness. Otherwise nursing staff report no further concerns. Patient is agreeable to home health care and Mayo catheter care, as well as utilizing seizure medications which she has not used for several months prior to this date. Spent over 1 hour in total care of this patient from 1: 00 p.m.-2:45 PM.  Approximately 35 minutes spent in the direct evaluation of patient prior to discharge.     FAMILY AND SOCIAL HISTORY: See H&P for details    MEDICATIONS:   Acetaminophen tablet 325 mg daily 2 tablets every 4 hours as needed, aspirin 81 mg p.o. daily, atorvastatin 80 mg p.o. nightly

## 2020-02-27 ENCOUNTER — OFFICE VISIT (OUTPATIENT)
Dept: GERIATRIC MEDICINE | Age: 60
End: 2020-02-27
Payer: MEDICAID

## 2020-02-27 LAB
AVERAGE GLUCOSE: 97
HBA1C MFR BLD: 5 %

## 2020-02-27 PROCEDURE — 99309 SBSQ NF CARE MODERATE MDM 30: CPT | Performed by: PHYSICIAN ASSISTANT

## 2020-03-05 NOTE — PROGRESS NOTES
PATIENT: Khadar Munoz : 1960 DOS: 2020     [facility]    HPI: No acute evidence of hypertensive crisis or elevated BP or tachycardia/tachypnea. The patient is being seen today for monthly followup for history of global weakness status post CVA, urinary retention, essential hypertension. The patient refuses all medications ongoingly and vital signs checks, so difficult to assess. No evident crisis at this time. The patient does have a chronic Amyo catheter. Has not had any issues with urinary retention. However, he has had issues with them in the past when attempting Mayo catheter removal and voiding trial. At this time, no evident urinary problems, clear straw-colored urine at this time. The patient seems to be getting evident oral intake of fluids. Patient remains globally weak with unilateral weakness. The patient is making a turn with increasing participation in PT and OT, improved confidence, and improved overall social demeanor, much more attentive and willing to work with staff. The patient as always wants to discuss plan of discharge. Discussed at length over 30 minutes. Plan to discuss with administration and family about having patient go home with home health care and PT/OT in the home. Patient is making strides and is likely going to be able to tolerate therapy at home. The patient offers no acute complaints. The patient does have significant expressive aphasia, which makes it difficult to understand patient's speech. I understand approximately 5% to 10% of patient's speech at any given time. We will work with administration to assess situation and hopefully get patient to be discharged home with his family. MEDICATIONS: Reviewed. ALLERGIES: Reviewed. REVIEW OF SYSTEMS: Unable to establish due to patient's expressive dysphasia. PHYSICAL EXAMINATION: He is refusing vital sign checks via nursing staff.  GENERAL: The patient is very thin and

## 2020-03-23 ENCOUNTER — NURSE ONLY (OUTPATIENT)
Dept: GERIATRIC MEDICINE | Age: 60
End: 2020-03-23

## 2020-04-06 NOTE — PROGRESS NOTES
PATIENT: Kelsey Colon : 1960 DOS: 2020     [facility]    HISTORY OF PRESENT ILLNESS: Vital signs reviewed on site today, within normal limits. The patient being seen today for ongoing evaluation prior to discharge from facility. The patient has been taking his medications at this time. He has been compliant with overall care. He is excited to be going home with his brother and start a new decent life. We discussed today attempting to quit smoking for longevity purposes as well as ongoing Mayo catheter usage. The patient denies need for Mayo catheter. However, he does agree today after extensive discussion of over approximately 15 minutes that he will continue using Mayo catheter if needed due to his history of ongoing recurrent urinary retention. Labs evaluated today, within normal limits. Discussed utilizing nicotine patches. The patient denied. We will follow up with patient routinely for the rest of the time staying here at [facility]. No further complaints. MEDICATIONS: Reviewed. ALLERGIES: Reviewed. REVIEW OF SYSTEMS: CONSTITUTIONAL: No NVD. No new fever, chills. The patient is globally weak at baseline, however, improving strength with PT, OT. No acute weight changes. Patient's appetite is fair. Diet: Does not like diet. NEUROLOGIC: The patient does have unilateral weakness. CARDIAC: No CP, orthopnea, DIAZ, lower leg edema or claudication reported. PULMONARY: No new cough, SOB, wheezing, POI. GI: No new complaints. : She does have chronic Mayo catheter due to history of urinary retention. No new hematuria, dysuria, urgency, or discharge noted per patient and nursing staff. PSYCHIATRIC: The patient's mood is overall improved overall over the past 3 to 4 weeks. Good outlook and beginning to treat fellow patients and nursing staff with more respect and dignity and is more compliant with care. Remaining 14-point ROS unremarkable.      PHYSICAL EXAMINATION: GENERAL: The patient fair hygiene overall and is in no acute distress, happy and excited to be getting ready to leave home. He is seen in PT / OT today. HEENT: Pupils equal, round, reactive to light. No icterus or injection. MMM: No erythema or exudate on oropharyngeal exam. CARDIAC: Regular rate and rhythm. No JVD noted today. No significant lower leg edema. PULMONARY: Lung sounds overall clear. Some moderate expiratory wheezing noted on exam of upper lungs. ABDOMEN: Normoactive bowel sounds, soft, nontender abdomen. MENTAL STATUS: The patient is awake, alert, oriented 2/3. Severe expressive aphasia. ASSESSMENT AND PLAN:   1. Chronic Mayo catheter. The patient agreed to continue at home. We will supply patient with necessary prescriptions as needed. 2.  Smoking cessation. The patient denied nicotine patches and wants to continue smoking. Encouraged to regard education in the future for longevity purposes. 3.  Medical compliance. The patient is taking all his medications at this time. He is compliant with all medications and routine care. No new orders.          Electronically Signed By: Queta Young PA-C on 04/05/2020 13:38:32  ______________________________  SOBIA Sharma/[id number]  D: 03/29/2020 21:57:11  T: 03/29/2020 23:49:48    cc: [phone] [facility]